# Patient Record
Sex: MALE | Race: WHITE | NOT HISPANIC OR LATINO | URBAN - METROPOLITAN AREA
[De-identification: names, ages, dates, MRNs, and addresses within clinical notes are randomized per-mention and may not be internally consistent; named-entity substitution may affect disease eponyms.]

---

## 2024-11-05 ENCOUNTER — APPOINTMENT (OUTPATIENT)
Dept: GENERAL RADIOLOGY | Facility: HOSPITAL | Age: 69
End: 2024-11-05
Payer: MEDICARE

## 2024-11-05 ENCOUNTER — HOSPITAL ENCOUNTER (OUTPATIENT)
Facility: HOSPITAL | Age: 69
Discharge: HOME | End: 2024-11-05
Attending: EMERGENCY MEDICINE | Admitting: EMERGENCY MEDICINE
Payer: MEDICARE

## 2024-11-05 VITALS
TEMPERATURE: 98.7 F | HEART RATE: 100 BPM | WEIGHT: 188 LBS | OXYGEN SATURATION: 94 % | BODY MASS INDEX: 27.85 KG/M2 | SYSTOLIC BLOOD PRESSURE: 112 MMHG | RESPIRATION RATE: 16 BRPM | DIASTOLIC BLOOD PRESSURE: 73 MMHG | HEIGHT: 69 IN

## 2024-11-05 DIAGNOSIS — R05.1 ACUTE COUGH: Primary | ICD-10-CM

## 2024-11-05 LAB
FLUAV SUBTYP SPEC NAA+PROBE: NOT DETECTED
FLUBV RNA ISLT QL NAA+PROBE: NOT DETECTED
SARS-COV-2 RNA RESP QL NAA+PROBE: NOT DETECTED

## 2024-11-05 PROCEDURE — 87636 SARSCOV2 & INF A&B AMP PRB: CPT | Performed by: EMERGENCY MEDICINE

## 2024-11-05 PROCEDURE — 71045 X-RAY EXAM CHEST 1 VIEW: CPT

## 2024-11-05 PROCEDURE — G0463 HOSPITAL OUTPT CLINIC VISIT: HCPCS | Performed by: EMERGENCY MEDICINE

## 2024-11-05 PROCEDURE — 63710000001 PREDNISONE PER 5 MG: Performed by: EMERGENCY MEDICINE

## 2024-11-05 RX ORDER — AZITHROMYCIN 250 MG/1
TABLET, FILM COATED ORAL
Qty: 6 TABLET | Refills: 0 | Status: SHIPPED | OUTPATIENT
Start: 2024-11-05

## 2024-11-05 RX ORDER — AZITHROMYCIN 250 MG/1
500 TABLET, FILM COATED ORAL ONCE
Status: COMPLETED | OUTPATIENT
Start: 2024-11-05 | End: 2024-11-05

## 2024-11-05 RX ORDER — GUAIFENESIN/DEXTROMETHORPHAN 100-10MG/5
5 SYRUP ORAL ONCE
Status: COMPLETED | OUTPATIENT
Start: 2024-11-05 | End: 2024-11-05

## 2024-11-05 RX ORDER — PREDNISONE 20 MG/1
TABLET ORAL
Qty: 9 TABLET | Refills: 0 | Status: SHIPPED | OUTPATIENT
Start: 2024-11-05

## 2024-11-05 RX ORDER — DEXTROMETHORPHAN HYDROBROMIDE AND PROMETHAZINE HYDROCHLORIDE 15; 6.25 MG/5ML; MG/5ML
5 SYRUP ORAL 4 TIMES DAILY PRN
Qty: 120 ML | Refills: 0 | Status: SHIPPED | OUTPATIENT
Start: 2024-11-05 | End: 2024-11-11

## 2024-11-05 RX ADMIN — GUAIFENESIN SYRUP AND DEXTROMETHORPHAN 5 ML: 100; 10 SYRUP ORAL at 15:10

## 2024-11-05 RX ADMIN — PREDNISONE 60 MG: 50 TABLET ORAL at 15:09

## 2024-11-05 RX ADMIN — AZITHROMYCIN 500 MG: 250 TABLET, FILM COATED ORAL at 15:10

## 2024-11-05 NOTE — DISCHARGE INSTRUCTIONS
Today your COVID and influenza are negative.  On the chest x-ray we do not identify pneumonia    We certainly do need to treat you for a pneumonia.  I did give you your first dose of steroids and antibiotic here today.  Your next dose of the prednisone and azithromycin will not be due until tomorrow.    Return anytime for worsening signs or symptoms    Please read all of the instructions in this handout.  If you receive prescriptions please fill them and take them as directed.  Please call your primary care physician for follow-up appointment in the next 5 to 7 days.  If you do not have a physician you may call the [facility] referral line at [phone].    You may return to the emergency department at any time for any concerns such as worsening symptoms.  If you received a work or school note it will be printed at the back of this packet.

## 2024-11-05 NOTE — [facility] PROVIDER NOTE
EMERGENCY DEPARTMENT ENCOUNTER    Room Number:  10/10  Date seen:  11/5/2024  Time seen: 14:08 EST  PCP: Walker Montano MD  Historian:     Discussed/obtained information from independent historians:     HPI:    Patient is a very nice 68-year-old male.  He states that a few weeks ago he had significant sinus congestion as well as a cough.  He was treated with a short course of steroids with improvement.  He states that unfortunately the symptoms have returned over the last week with significant nasal congestion, postnasal drainage, and cough.  The cough likewise is worsening with some production of sputum.  No documented fever or chills.  No known sick contacts     external (non-ED) record review:        Chronic or social conditions impacting care:    ALLERGIES  Patient has no known allergies.    PAST MEDICAL HISTORY  Active Ambulatory Problems     Diagnosis Date Noted    No Active Ambulatory Problems     Resolved Ambulatory Problems     Diagnosis Date Noted    No Resolved Ambulatory Problems     No Additional Past Medical History       PAST SURGICAL HISTORY  Past Surgical History:   Procedure Laterality Date    CATARACT EXTRACTION Bilateral     CHOLECYSTECTOMY      KNEE ARTHROSCOPY Bilateral        FAMILY HISTORY  History reviewed. No pertinent family history.    SOCIAL HISTORY  Social History     Socioeconomic History    Marital status:        REVIEW OF SYSTEMS  Review of Systems    All systems reviewed and negative except for those discussed in HPI.       PHYSICAL EXAM    I have reviewed the triage vital signs and nursing notes.  Vitals:    11/05/24 1402   BP: 112/73   Pulse: 100   Resp: 16   Temp: 98.7 °F (37.1 °C)   SpO2: 94%     Physical Exam  Vital signs: Reviewed in nurses notes    General: Awake alert.  He does appear to feel poorly but is nontoxic    HEENT: Normocephalic atraumatic nasopharynx is very congested.  Oropharynx is mildly erythematous without exudate or abscess    Neck:    Supple without lymphadenopathy    Respiratory:   There are scattered coarse expiratory wheezing bilaterally.    Cardiovascular: Regular rate and rhythm.  No murmur.  Equal pulses in bilateral lower extremities without edema.    Abdomen: Nondistended    Skin:   Warm and dry.  No rashes noted    Neurological examination: Patient is awake alert oriented x4.  Speech is normal.  No facial palsy.  No focal motor or sensory deficits.  LAB RESULTS  Recent Results (from the past 24 hours)   COVID-19 and FLU A/B PCR, 1 HR TAT - Swab, Nasopharynx    Collection Time: 11/05/24  2:04 PM    Specimen: Nasopharynx; Swab   Result Value Ref Range    COVID19 Not Detected Not Detected - Ref. Range    Influenza A PCR Not Detected Not Detected    Influenza B PCR Not Detected Not Detected       Ordered the above labs and independently interpreted results.  My findings will be discussed in the ED course or medical decision making section below      PROCEDURES:  Procedures      RADIOLOGY RESULTS  XR Chest 1 View    Result Date: 11/5/2024  XR CHEST 1 VW-  HISTORY: Male who is 68 years-old, cough  TECHNIQUE: Frontal view of the chest  COMPARISON: None available  FINDINGS: Heart size is normal. Aorta is calcified. Pulmonary vasculature is unremarkable. No focal pulmonary consolidation, pleural effusion, or pneumothorax. No acute osseous process.      No focal pulmonary consolidation. Follow-up as clinical indications persist.  This report was finalized on 11/5/2024 2:02 PM by Dr. Seymour Reyes M.D on Workstation: [facility]        Ordered the above noted radiological studies.  Independently interpreted by me.  My findings will be discussed in the medical decision section below.     PROGRESS, DATA ANALYSIS, CONSULTS AND MEDICAL DECISION MAKING    Please note that this section constitutes my independent interpretation of clinical data including lab results, radiology, EKG's.  This constitutes my independent professional opinion regarding  differential diagnosis and management of this patient.  It may include any factors such as history from outside sources, review of external records, social determinants of health, management of medications, response to those treatments, and discussions with other providers.       Orders placed during this visit:  Orders Placed This Encounter   Procedures    COVID-19 and FLU A/B PCR, 1 HR TAT - Swab, Nasopharynx    XR Chest 1 View       Medications   predniSONE (DELTASONE) tablet 60 mg (has no administration in time range)   azithromycin (ZITHROMAX) tablet 500 mg (has no administration in time range)   guaiFENesin-dextromethorphan (ROBITUSSIN DM) 100-10 MG/5ML syrup 5 mL (has no administration in time range)            Medical Decision Making          DIAGNOSIS  Final diagnoses:   Acute cough          Medication List        New Prescriptions      azithromycin 250 MG tablet  Commonly known as: ZITHROMAX  2 po on day one, then 1 po daily x 4 days.     predniSONE 20 MG tablet  Commonly known as: DELTASONE  3 po daily x 1d, then 2 po daily x 2d, then 1 po daily x 2d.     promethazine-dextromethorphan 6.25-15 MG/5ML syrup  Commonly known as: PROMETHAZINE-DM  Take 5 mL by mouth 4 (Four) Times a Day As Needed for Cough for up to 6 days.               Where to Get Your Medications        These medications were sent to [id number] - [facility] [address] AT [facility] [address] [facility] - [phone] [facility] [phone]   [address] [facility] [address]      Phone: [phone]   azithromycin 250 MG tablet  predniSONE 20 MG tablet  promethazine-dextromethorphan 6.25-15 MG/5ML syrup         FOLLOW-UP  Walker Montano MD  [address] [id number] [phone]    In 1 week          Latest Documented Vital Signs:  As of 15:00 EST  BP- 112/73 HR- 100 Temp- 98.7 °F (37.1 °C) (Oral) O2 sat- 94%    Appropriate PPE utilized throughout this patient encounter to  include mask, if indicated, per current protocol. Hand hygiene was performed before donning PPE and after removal when leaving the room.    Please note that portions of this were completed with a voice recognition program.     Note Disclaimer: At [facility], we believe that sharing information builds trust and better relationships. You are receiving this note because you are receiving care at [facility] or recently visited. It is possible you will see health information before a provider has talked with you about it. This kind of information can be easy to misunderstand. To help you fully understand what it means for your health, we urge you to discuss this note with your provider.

## 2025-07-03 NOTE — PROGRESS NOTES
Chief Complaint  Abdominal Pain    Subjective        History of Present Illness    David Cornelius is a 69 year-old male, new to our practice.  Referred to GI service for right lower quadrant and also right upper quadrant abdominal pain    RLQ pain  - He reports experiencing discomfort in the lower right quadrant, CT abdomen and pelvis was done, refer to report below for complete details. Results ruled out acute appendicitis.    - His bowel movements are irregular, sometimes normal at the start of the day but changing by the end of the day or the next day. This issue has persisted for several years. He reports no presence of blood in his stool. [facility] in [facility].      - He reports no unintentional weight loss. He does not consume alcohol and uses NSAIDs sparingly.   - He experiences nausea and vomiting if he eats in the morning, and his stomach becomes upset, preventing him from leaving the house until it settles.     RUQ pain  - He occasionally experiences pain in the right upper quadrant, which is not severe but causes discomfort. He has had two lipomas removed and currently has one.     - Pain is described as dull aching, intermittently occurs without a known specific pattern. Had GB removed years ago.     - He admits to poor eating habits and often feels constipated. He has more issues with diarrhea than constipation.    SOCIAL HISTORY  He does not drink alcohol.    FAMILY HISTORY  His father had colon cancer.     Results Reviewed:  3/7/25  CMP (normal LFTs)    COLONOSCOPY (09/18/2023 12:03)   - Tortuous colon./  - Diverticulosis.  - Internal hemorrhoids.  - No specimens collected.     CT Abdomen Pelvis With Contrast (04/18/2025 15:44)   - Surgically absent gallbladder.  No biliary ductal dilatation  - Multiple small liver lesions, 1.2 cm/filling hemangioma in the hepatic dome  - Extensive colonic diverticulosis without evidence of acute diverticulitis  "(recommend colonoscopy if not up-to-date)  - Normal appendix  (Refer to report for complete details)    Objective   Vital Signs:  /70 (BP Location: Left arm, Patient Position: Sitting, Cuff Size: Adult)   Pulse 90   Temp 97.3 °F (36.3 °C)   Ht 175.3 cm (69.02\")   Wt 88.7 kg (195 lb 8 oz)   SpO2 99%   BMI 28.86 kg/m²   Estimated body mass index is 28.86 kg/m² as calculated from the following:    Height as of this encounter: 175.3 cm (69.02\").    Weight as of this encounter: 88.7 kg (195 lb 8 oz).         Physical Exam  Vitals and nursing note reviewed.   Constitutional:       General: He is not in acute distress.     Appearance: Normal appearance. He is normal weight. He is not ill-appearing, toxic-appearing or diaphoretic.   Eyes:      General: No scleral icterus.     Conjunctiva/sclera: Conjunctivae normal.   Pulmonary:      Effort: Pulmonary effort is normal.   Abdominal:      General: Abdomen is flat. Bowel sounds are normal. There is no distension.      Palpations: Abdomen is soft. There is no mass.      Tenderness: There is abdominal tenderness. There is no right CVA tenderness, left CVA tenderness, guarding or rebound.      Hernia: No hernia is present.      Comments: Mild tenderness to RUQ with palpation  A large rectus diastasis, non-tender   Skin:     Coloration: Skin is not jaundiced or pale.      Findings: No erythema or rash.   Neurological:      Mental Status: He is alert and oriented to person, place, and time. Mental status is at baseline.   Psychiatric:         Mood and Affect: Mood normal.         Thought Content: Thought content normal.         Judgment: Judgment normal.               Assessment and Plan     Diagnoses and all orders for this visit:    1. Abnormal CT of the abdomen (Primary)    2. Irregular bowel habits    3. Diverticulosis    4. Right lower quadrant abdominal pain    5. Internal hemorrhoids    6. Right upper quadrant abdominal pain  -     MRI Abdomen With & Without " Contrast    7. Liver lesion  -     MRI Abdomen With & Without Contrast       Assessment & Plan  1. Right upper abdominal pain.  - CT AP above revealed multiple liver lesions, despite normal LFTs and ALK. I recommend MRI with and without contrast, liver protocol for further evaluation.   - Discussed diet low in fat, avoid greasy or spicy food, increase water and high fiber diet.     2. Right lower abdominal quadrant, r/o appendicitis  3. Extensive colonic diverticulosis, last colonoscopy in 2023: no specimens collected  4. Changes in bowel habits  5. Father has CRC  - Recommend a repeat colonoscopy.   - Discussed high fiber diet and prevent constipation in the setting of extensive diverticulosis and internal hemorrhoids, as well as looping of the colon.   - Recommend over-the-counter fiber supplements such as Metamucil or Citrucel are recommended. If constipation occurs, MiraLAX can be used.     6. 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  - He experiences loose stools after eating certain foods and has mentioned of having more loose stool than formed, which is suspected to be postcholecystectomy diarrhea. Cholestyramine powder may be considered after the colonoscopy to help reduce stool frequency.    Follow-up  A follow-up appointment will be scheduled after the colonoscopy.     Return for After Colonoscopy.    I have reviewed patient's current medications list, relevant clinical information necessary for today's encounter. I discussed the clinical impression and treatment plan with the patient, who verbalized understanding and in agreement.  All questions were answered and support was provided.    EMR Dragon/Transcription Disclaimer:  This document has been Dictated utilizing Dragon dictation.     Patient or patient representative verbalized consent for the use of Ambient Listening during the visit with  RODRIGO Gooden for chart documentation. 7/10/2025  10:01 EDT    Patient Instructions   I recommend that we  proceed with colonoscopy for further evaluation for potential etiologies for proctitis, colitis, ulceration,  ano-rectal masses, colo-rectal cancer, diverticulosis, inflammatory bowel disease, AVMs in the colon, polyps, or mass/malignancy.  Further recommendations to follow depending upon endoscopic findings and clinical course.     MRI abdomen has been ordered to further evaluate liver lesions - A team member from [facility] centralized scheduling department will schedule your test(s). If you do not hear from them, please contact them directly at [phone].

## 2025-07-10 ENCOUNTER — OFFICE VISIT (OUTPATIENT)
Dept: GASTROENTEROLOGY | Facility: CLINIC | Age: 70
End: 2025-07-10
Payer: MEDICARE

## 2025-07-10 ENCOUNTER — PREP FOR SURGERY (OUTPATIENT)
Dept: SURGERY | Facility: SURGERY CENTER | Age: 70
End: 2025-07-10
Payer: MEDICARE

## 2025-07-10 VITALS
SYSTOLIC BLOOD PRESSURE: 120 MMHG | WEIGHT: 195.5 LBS | HEART RATE: 90 BPM | DIASTOLIC BLOOD PRESSURE: 70 MMHG | HEIGHT: 69 IN | TEMPERATURE: 97.3 F | OXYGEN SATURATION: 99 % | BODY MASS INDEX: 28.96 KG/M2

## 2025-07-10 DIAGNOSIS — R10.31 RIGHT LOWER QUADRANT ABDOMINAL PAIN: ICD-10-CM

## 2025-07-10 DIAGNOSIS — Z80.0 FAMILY HISTORY OF COLON CANCER IN FATHER: ICD-10-CM

## 2025-07-10 DIAGNOSIS — K64.8 INTERNAL HEMORRHOIDS: ICD-10-CM

## 2025-07-10 DIAGNOSIS — R10.11 RIGHT UPPER QUADRANT ABDOMINAL PAIN: ICD-10-CM

## 2025-07-10 DIAGNOSIS — R93.5 ABNORMAL CT OF THE ABDOMEN: Primary | ICD-10-CM

## 2025-07-10 DIAGNOSIS — K76.9 LIVER LESION: ICD-10-CM

## 2025-07-10 DIAGNOSIS — R93.5 ABNORMAL CT OF THE ABDOMEN: ICD-10-CM

## 2025-07-10 DIAGNOSIS — R19.8 IRREGULAR BOWEL HABITS: ICD-10-CM

## 2025-07-10 DIAGNOSIS — K57.90 DIVERTICULOSIS: ICD-10-CM

## 2025-07-10 DIAGNOSIS — K57.90 DIVERTICULOSIS: Primary | ICD-10-CM

## 2025-07-10 PROCEDURE — 1160F RVW MEDS BY RX/DR IN RCRD: CPT | Performed by: NURSE PRACTITIONER

## 2025-07-10 PROCEDURE — 99204 OFFICE O/P NEW MOD 45 MIN: CPT | Performed by: NURSE PRACTITIONER

## 2025-07-10 PROCEDURE — 1159F MED LIST DOCD IN RCRD: CPT | Performed by: NURSE PRACTITIONER

## 2025-07-10 RX ORDER — HYDROCODONE BITARTRATE AND ACETAMINOPHEN 5; 325 MG/1; MG/1
TABLET ORAL
COMMUNITY
Start: 2025-03-18

## 2025-07-10 RX ORDER — LISINOPRIL 10 MG/1
10 TABLET ORAL DAILY
COMMUNITY

## 2025-07-10 RX ORDER — SODIUM CHLORIDE 0.9 % (FLUSH) 0.9 %
3 SYRINGE (ML) INJECTION EVERY 12 HOURS SCHEDULED
OUTPATIENT
Start: 2025-07-10

## 2025-07-10 RX ORDER — SODIUM CHLORIDE 0.9 % (FLUSH) 0.9 %
10 SYRINGE (ML) INJECTION AS NEEDED
OUTPATIENT
Start: 2025-07-10

## 2025-07-10 RX ORDER — TAMSULOSIN HYDROCHLORIDE 0.4 MG/1
0.4 CAPSULE ORAL DAILY
COMMUNITY

## 2025-07-10 RX ORDER — LEVOTHYROXINE SODIUM 25 UG/1
25 TABLET ORAL DAILY
COMMUNITY

## 2025-07-10 RX ORDER — SODIUM CHLORIDE, SODIUM LACTATE, POTASSIUM CHLORIDE, CALCIUM CHLORIDE 600; 310; 30; 20 MG/100ML; MG/100ML; MG/100ML; MG/100ML
30 INJECTION, SOLUTION INTRAVENOUS ONCE
OUTPATIENT
Start: 2025-07-10

## 2025-07-10 RX ORDER — TESTOSTERONE 1.62 MG/G
GEL TRANSDERMAL
COMMUNITY
Start: 2025-06-03

## 2025-07-10 NOTE — PATIENT INSTRUCTIONS
I recommend that we proceed with colonoscopy for further evaluation for potential etiologies for proctitis, colitis, ulceration,  ano-rectal masses, colo-rectal cancer, diverticulosis, inflammatory bowel disease, AVMs in the colon, polyps, or mass/malignancy.  Further recommendations to follow depending upon endoscopic findings and clinical course.     MRI abdomen has been ordered to further evaluate liver lesions - A team member from [facility] centralized scheduling department will schedule your test(s). If you do not hear from them, please contact them directly at [phone].

## 2025-08-08 ENCOUNTER — LAB REQUISITION (OUTPATIENT)
Dept: LAB | Facility: HOSPITAL | Age: 70
End: 2025-08-08
Payer: MEDICARE

## 2025-08-08 ENCOUNTER — OUTSIDE FACILITY SERVICE (OUTPATIENT)
Dept: GASTROENTEROLOGY | Facility: CLINIC | Age: 70
End: 2025-08-08
Payer: MEDICARE

## 2025-08-08 DIAGNOSIS — D12.0 BENIGN NEOPLASM OF CECUM: ICD-10-CM

## 2025-08-08 DIAGNOSIS — D12.2 BENIGN NEOPLASM OF ASCENDING COLON: ICD-10-CM

## 2025-08-08 DIAGNOSIS — K64.9 UNSPECIFIED HEMORRHOIDS: ICD-10-CM

## 2025-08-08 DIAGNOSIS — Z12.11 ENCOUNTER FOR SCREENING FOR MALIGNANT NEOPLASM OF COLON: ICD-10-CM

## 2025-08-08 DIAGNOSIS — K57.30 DIVERTICULOSIS OF LARGE INTESTINE WITHOUT PERFORATION OR ABSCESS WITHOUT BLEEDING: ICD-10-CM

## 2025-08-08 PROCEDURE — 45385 COLONOSCOPY W/LESION REMOVAL: CPT | Performed by: INTERNAL MEDICINE

## 2025-08-08 PROCEDURE — 88305 TISSUE EXAM BY PATHOLOGIST: CPT | Performed by: INTERNAL MEDICINE

## 2025-08-08 PROCEDURE — 45380 COLONOSCOPY AND BIOPSY: CPT | Performed by: INTERNAL MEDICINE

## 2025-08-11 LAB
CYTO UR: NORMAL
LAB AP CASE REPORT: NORMAL
LAB AP CLINICAL INFORMATION: NORMAL
PATH REPORT.FINAL DX SPEC: NORMAL
PATH REPORT.GROSS SPEC: NORMAL

## 2025-08-29 ENCOUNTER — OFFICE VISIT (OUTPATIENT)
Dept: GASTROENTEROLOGY | Facility: CLINIC | Age: 70
End: 2025-08-29
Payer: MEDICARE

## 2025-08-29 ENCOUNTER — HOSPITAL ENCOUNTER (OUTPATIENT)
Dept: MRI IMAGING | Facility: HOSPITAL | Age: 70
Discharge: HOME | End: 2025-08-29
Payer: MEDICARE

## 2025-08-29 ENCOUNTER — HOSPITAL ENCOUNTER (OUTPATIENT)
Dept: GENERAL RADIOLOGY | Facility: HOSPITAL | Age: 70
Discharge: HOME | End: 2025-08-29
Payer: MEDICARE

## 2025-08-29 VITALS — HEIGHT: 69 IN | WEIGHT: 194.5 LBS | BODY MASS INDEX: 28.81 KG/M2

## 2025-08-29 DIAGNOSIS — K57.90 DIVERTICULOSIS: Primary | ICD-10-CM

## 2025-08-29 DIAGNOSIS — K76.9 LIVER LESION: ICD-10-CM

## 2025-08-29 DIAGNOSIS — K63.5 POLYP OF COLON, UNSPECIFIED PART OF COLON, UNSPECIFIED TYPE: ICD-10-CM

## 2025-08-29 DIAGNOSIS — T14.8XXA STAPLED SKIN WOUND: ICD-10-CM

## 2025-08-29 PROCEDURE — A9577 INJ MULTIHANCE: HCPCS | Performed by: NURSE PRACTITIONER

## 2025-08-29 PROCEDURE — 74018 RADEX ABDOMEN 1 VIEW: CPT

## 2025-08-29 PROCEDURE — 76014 MR SFTY IMPLT&/FB ASMT STF 1: CPT

## 2025-08-29 PROCEDURE — 74183 MRI ABD W/O CNTR FLWD CNTR: CPT

## 2025-08-29 PROCEDURE — 25510000002 GADOBENATE DIMEGLUMINE 529 MG/ML SOLUTION: Performed by: NURSE PRACTITIONER

## 2025-08-29 RX ORDER — DULOXETIN HYDROCHLORIDE 30 MG/1
30 CAPSULE, DELAYED RELEASE ORAL 2 TIMES DAILY
COMMUNITY

## 2025-08-29 RX ADMIN — GADOBENATE DIMEGLUMINE 18 ML: 529 INJECTION, SOLUTION INTRAVENOUS at 15:51
